# Patient Record
Sex: FEMALE | Race: WHITE | NOT HISPANIC OR LATINO | Employment: FULL TIME | URBAN - METROPOLITAN AREA
[De-identification: names, ages, dates, MRNs, and addresses within clinical notes are randomized per-mention and may not be internally consistent; named-entity substitution may affect disease eponyms.]

---

## 2017-08-12 ENCOUNTER — HOSPITAL ENCOUNTER (OUTPATIENT)
Dept: ULTRASOUND IMAGING | Facility: HOSPITAL | Age: 27
Discharge: HOME | End: 2017-08-12
Attending: INTERNAL MEDICINE | Admitting: INTERNAL MEDICINE

## 2021-08-20 ENCOUNTER — HOSPITAL ENCOUNTER (EMERGENCY)
Facility: HOSPITAL | Age: 31
Discharge: HOME | End: 2021-08-20
Admitting: EMERGENCY MEDICINE

## 2021-08-20 ENCOUNTER — APPOINTMENT (OUTPATIENT)
Dept: GENERAL RADIOLOGY | Facility: HOSPITAL | Age: 31
End: 2021-08-20

## 2021-08-20 VITALS
HEART RATE: 78 BPM | OXYGEN SATURATION: 99 % | BODY MASS INDEX: 40.46 KG/M2 | SYSTOLIC BLOOD PRESSURE: 142 MMHG | HEIGHT: 68 IN | TEMPERATURE: 97.7 F | WEIGHT: 266.98 LBS | DIASTOLIC BLOOD PRESSURE: 95 MMHG | RESPIRATION RATE: 16 BRPM

## 2021-08-20 DIAGNOSIS — S82.64XA NONDISPLACED FRACTURE OF LATERAL MALLEOLUS OF RIGHT FIBULA, INITIAL ENCOUNTER FOR CLOSED FRACTURE: Primary | ICD-10-CM

## 2021-08-20 PROCEDURE — 73610 X-RAY EXAM OF ANKLE: CPT

## 2021-08-20 PROCEDURE — 99283 EMERGENCY DEPT VISIT LOW MDM: CPT

## 2021-08-20 NOTE — DISCHARGE INSTRUCTIONS
Wear boot.  Elevate and ice over the next 2 days.  Use ibuprofen every 8 hours for pain.  Follow-up with orthopedics.  Return for new or worsening symptoms.

## 2021-08-20 NOTE — ED NOTES
Pt reports she sprained her right ankle this morning. Reports she has had trauma to that same ankle before.     Tatianna Adkins RN  08/20/21 0719

## 2021-08-20 NOTE — ED PROVIDER NOTES
Subjective   Patient is a 31-year-old white female with no significant medical history presents today with complaints of an injury to her right ankle.  She states she was walking this morning when her she twisted her right foot and ankle and it turned inward.  She complained of immediate pain swelling to the lateral aspect of the right ankle.  She states she has had injury and sprains to this ankle before.  She complains of pain with weightbearing.  She denies numbness tingling or weakness distal to the injury.  She denies any other injury or complaint.          Review of Systems   Musculoskeletal:        Right ankle pain/injury   Skin: Negative for wound.   Neurological: Negative for weakness and numbness.       No past medical history on file.    Allergies   Allergen Reactions   • Codeine GI Intolerance   • Penicillins Hives       No past surgical history on file.    No family history on file.    Social History     Socioeconomic History   • Marital status:      Spouse name: Not on file   • Number of children: Not on file   • Years of education: Not on file   • Highest education level: Not on file           Objective   Physical Exam  Vital signs and triage nurse note reviewed.  Constitutional: Awake, alert; well-developed and well-nourished. No acute distress is noted.  Cardiovascular: Regular rate and rhythm  Pulmonary: Respiratory effort regular nonlabored  Musculoskeletal: Independent range of motion of all extremities.  There is tenderness and edema noted over the right lateral malleolus.  No ecchymosis or erythema.  No open wounds.  No crepitus.  No tenderness or pain proximally.  Distal neurovascular motor intact.  Neuro: Alert oriented x3, speech is clear and appropriate, GCS 15.    Skin: Flesh tone, warm, dry, intact; no erythematous or petechial rash or lesion.      Procedures           ED Course      Labs Reviewed - No data to display  XR Ankle 3+ View Right    Result Date: 8/20/2021   1. FINDINGS  suspicious for hairline nondisplaced fracture extending transversely through the lateral malleolus. 2. Corticated calcific fragments at the inferior and lateral margin of lateral malleolus suggests sequelae of remote trauma. 3. Degenerative spurring at the inferior margin of the medial malleolus. 4. Right ankle soft tissue swelling.  Electronically Signed By-Norma Ricardo MD On:8/20/2021 8:51 AM This report was finalized on [id number] by  Norma Ricardo MD.    Medications - No data to display                                       MDM  Number of Diagnoses or Management Options  Nondisplaced fracture of lateral malleolus of right fibula, initial encounter for closed fracture  Diagnosis management comments: Comorbidities: None  Differentials: Fracture, dislocation, sprain;this list is not all inclusive and does not constitute the entirety of considered causes  Discussion with provider:  Radiology interpretation: X-rays reviewed by me and interpreted by radiologist: As above  Lab interpretation: Labs viewed by me significant for: Not warranted    Patient had x-rays obtained of the right ankle that show findings suspicious for hairline nondisplaced fracture extending to virtually through the lateral malleolus.  Corticated calcific fragments at the inferior lateral margin of the lateral malleolus suggest sequela of remote trauma.  Degenerative spurring at the inferior margin of the medial malleolus.  Right ankle soft tissue swelling.    The patient had a cam walker boot applied.  Distal motor, sensory and vascular status intact after application.  Patient offered crutches but declines at this time.  She reports she is currently uninsured and will obtain crutches elsewhere.    Diagnosis and treatment plan discussed with patient.  Patient agreeable to plan.   I discussed findings with patient who voices understanding of discharge instructions, signs and symptoms requiring return to ED; discharged improved and in stable  condition with follow up for re-evaluation.           Amount and/or Complexity of Data Reviewed  Tests in the radiology section of CPT®: reviewed and ordered    Patient Progress  Patient progress: stable      Final diagnoses:   Nondisplaced fracture of lateral malleolus of right fibula, initial encounter for closed fracture       ED Disposition  ED Disposition     ED Disposition Condition Comment    Discharge Stable           Suresh Keller MD  [address] [place] [id number] [phone]    Schedule an appointment as soon as possible for a visit       LINDA Boyer DPM  [address] [facility] [address]  [place] [id number] [phone]    Schedule an appointment as soon as possible for a visit            Medication List      No changes were made to your prescriptions during this visit.          Kassandra Puentes, SHELLEY  08/20/21 0978

## 2022-03-12 ENCOUNTER — TRANSCRIBE ORDERS (OUTPATIENT)
Dept: ADMINISTRATIVE | Facility: HOSPITAL | Age: 32
End: 2022-03-12

## 2022-03-12 ENCOUNTER — LAB (OUTPATIENT)
Dept: LAB | Facility: HOSPITAL | Age: 32
End: 2022-03-12

## 2022-03-12 DIAGNOSIS — E78.5 HYPERLIPIDEMIA, UNSPECIFIED HYPERLIPIDEMIA TYPE: Primary | ICD-10-CM

## 2022-03-12 DIAGNOSIS — E78.5 HYPERLIPIDEMIA, UNSPECIFIED HYPERLIPIDEMIA TYPE: ICD-10-CM

## 2022-03-12 LAB
CHOLEST SERPL-MCNC: 177 MG/DL (ref 0–200)
HDLC SERPL-MCNC: 45 MG/DL (ref 40–60)
LDLC SERPL CALC-MCNC: 116 MG/DL (ref 0–100)
LDLC/HDLC SERPL: 2.55 {RATIO}
TRIGL SERPL-MCNC: 86 MG/DL (ref 0–150)
VLDLC SERPL-MCNC: 16 MG/DL (ref 5–40)

## 2022-03-12 PROCEDURE — 36415 COLL VENOUS BLD VENIPUNCTURE: CPT

## 2022-03-12 PROCEDURE — 80061 LIPID PANEL: CPT

## 2022-03-12 PROCEDURE — 83704 LIPOPROTEIN BLD QUAN PART: CPT

## 2022-03-15 LAB
CHOLEST SERPL-MCNC: 176 MG/DL (ref 100–199)
HDL SERPL-SCNC: 32.4 UMOL/L
HDLC SERPL-MCNC: 49 MG/DL
LDL SERPL QN: 21.2 NM
LDL SERPL-SCNC: 1294 NMOL/L
LDL SMALL SERPL-SCNC: 490 NMOL/L
LDLC SERPL CALC-MCNC: 109 MG/DL (ref 0–99)
LP-IR SCORE SERPL: 27
TRIGL SERPL-MCNC: 99 MG/DL (ref 0–149)

## 2025-07-28 ENCOUNTER — TRANSCRIBE ORDERS (OUTPATIENT)
Dept: ADMINISTRATIVE | Facility: HOSPITAL | Age: 35
End: 2025-07-28
Payer: COMMERCIAL

## 2025-07-28 DIAGNOSIS — E06.3 AUTOIMMUNE THYROIDITIS: Primary | ICD-10-CM

## 2025-07-28 DIAGNOSIS — E04.0 NONTOXIC DIFFUSE GOITER: ICD-10-CM
